# Patient Record
Sex: MALE | Race: WHITE | URBAN - METROPOLITAN AREA
[De-identification: names, ages, dates, MRNs, and addresses within clinical notes are randomized per-mention and may not be internally consistent; named-entity substitution may affect disease eponyms.]

---

## 2021-05-06 ENCOUNTER — OFFICE VISIT (OUTPATIENT)
Dept: URBAN - METROPOLITAN AREA CLINIC 7 | Facility: CLINIC | Age: 76
End: 2021-05-06
Payer: MEDICARE

## 2021-05-06 DIAGNOSIS — H25.13 AGE-RELATED NUCLEAR CATARACT, BILATERAL: ICD-10-CM

## 2021-05-06 DIAGNOSIS — H43.813 BILATERAL VITREOUS DEGENERATION OF EYES: ICD-10-CM

## 2021-05-06 PROCEDURE — 92134 CPTRZ OPH DX IMG PST SGM RTA: CPT | Performed by: OPHTHALMOLOGY

## 2021-05-06 PROCEDURE — 99204 OFFICE O/P NEW MOD 45 MIN: CPT | Performed by: OPHTHALMOLOGY

## 2021-05-06 ASSESSMENT — INTRAOCULAR PRESSURE
OS: 19
OD: 17

## 2021-05-06 NOTE — IMPRESSION/PLAN
Impression: Puckering of macula, bilateral: H35.373. Plan: Exam/OCT show moderate ERM with loss of foveal contour OU. Pt feels that [address] has declined over past year, but denies distortion. VA today is 20/50 in each eye. Recommend careful refraction. If VA can be corrected, recommend new MRx. If VA cannot be corrected, then will proceed with PPV/MP. Thanks, Dixie Steele. 3 months, OCT OU Addendum: Spoke with Dr. Shirley Carey, who relayed that patient has already been refracted and cannot be corrected better than 20/40-2. Given this information, surgical correction is recommended. Will contact patient to inform him of RBAs of surgical intervention.

## 2021-07-07 ENCOUNTER — SURGERY (OUTPATIENT)
Dept: URBAN - METROPOLITAN AREA EXTERNAL CLINIC 26 | Facility: EXTERNAL CLINIC | Age: 76
End: 2021-07-07
Payer: MEDICARE

## 2021-07-07 PROCEDURE — 67042 VIT FOR MACULAR HOLE: CPT | Performed by: OPHTHALMOLOGY

## 2021-07-08 ENCOUNTER — POST-OPERATIVE VISIT (OUTPATIENT)
Dept: URBAN - METROPOLITAN AREA CLINIC 7 | Facility: CLINIC | Age: 76
End: 2021-07-08
Payer: MEDICARE

## 2021-07-08 PROCEDURE — 99024 POSTOP FOLLOW-UP VISIT: CPT | Performed by: OPHTHALMOLOGY

## 2021-07-08 ASSESSMENT — INTRAOCULAR PRESSURE
OS: 18
OD: 10

## 2021-07-08 NOTE — IMPRESSION/PLAN
Impression: S/P PPV/MP OD - 1 Day. Puckering of macula, bilateral  H35.373. Plan: Retina attached. IOP WNL OU. No infection. Patient is doing well. Starting Prednisolone and Ofloxacin QID OD. Return in 1 week pos

## 2021-07-15 ENCOUNTER — POST-OPERATIVE VISIT (OUTPATIENT)
Dept: URBAN - METROPOLITAN AREA CLINIC 7 | Facility: CLINIC | Age: 76
End: 2021-07-15
Payer: MEDICARE

## 2021-07-15 DIAGNOSIS — H35.373 PUCKERING OF MACULA, BILATERAL: Primary | ICD-10-CM

## 2021-07-15 PROCEDURE — 99024 POSTOP FOLLOW-UP VISIT: CPT | Performed by: OPHTHALMOLOGY

## 2021-07-15 RX ORDER — OFLOXACIN 3 MG/ML
0.3 % SOLUTION/ DROPS OPHTHALMIC
Qty: 5 | Refills: 3 | Status: INACTIVE
Start: 2021-07-15 | End: 2021-07-29

## 2021-07-15 RX ORDER — PREDNISOLONE ACETATE 10 MG/ML
1 % SUSPENSION/ DROPS OPHTHALMIC
Qty: 5 | Refills: 3 | Status: INACTIVE
Start: 2021-07-15 | End: 2021-07-29

## 2021-07-15 ASSESSMENT — INTRAOCULAR PRESSURE
OD: 15
OS: 18

## 2021-07-15 NOTE — IMPRESSION/PLAN
Impression: S/P 25g PPV/MP OD - 8 Days. Puckering of macula, bilateral  H35.373. Plan: Doing well, taper Rx. Discussed RBA of obs vs PPV/MP for OS at length, pt would like to proceed with Sx. Will complete consents today and schedule at patient's convenience. 

1 month, POS OD

## 2021-07-28 ENCOUNTER — SURGERY (OUTPATIENT)
Dept: URBAN - METROPOLITAN AREA EXTERNAL CLINIC 26 | Facility: EXTERNAL CLINIC | Age: 76
End: 2021-07-28
Payer: MEDICARE

## 2021-07-28 PROCEDURE — 67042 VIT FOR MACULAR HOLE: CPT | Performed by: OPHTHALMOLOGY

## 2021-07-29 ENCOUNTER — POST-OPERATIVE VISIT (OUTPATIENT)
Dept: URBAN - METROPOLITAN AREA CLINIC 7 | Facility: CLINIC | Age: 76
End: 2021-07-29
Payer: MEDICARE

## 2021-07-29 PROCEDURE — 99024 POSTOP FOLLOW-UP VISIT: CPT | Performed by: OPHTHALMOLOGY

## 2021-07-29 ASSESSMENT — INTRAOCULAR PRESSURE
OS: 13
OD: 32

## 2021-07-29 NOTE — IMPRESSION/PLAN
Impression: S/P PPV, MP OS - 1 Day. Puckering of macula, bilateral  H35.373. Plan: Doing well. Start Rx. Stop Rx in OD. IOP OD elevated, possible steroid response, monitor. 

Return 1 week

## 2021-08-04 ENCOUNTER — POST-OPERATIVE VISIT (OUTPATIENT)
Dept: URBAN - METROPOLITAN AREA CLINIC 7 | Facility: CLINIC | Age: 76
End: 2021-08-04
Payer: MEDICARE

## 2021-08-04 PROCEDURE — 99024 POSTOP FOLLOW-UP VISIT: CPT | Performed by: OPHTHALMOLOGY

## 2021-08-04 ASSESSMENT — INTRAOCULAR PRESSURE
OS: 10
OD: 21

## 2021-08-04 NOTE — IMPRESSION/PLAN
Impression: S/P PPV, MP OS - 7 Days. Puckering of macula, bilateral  H35.373.  Plan: Doing well, taper Rx.

4-6 weeks

## 2021-09-01 ENCOUNTER — POST-OPERATIVE VISIT (OUTPATIENT)
Dept: URBAN - METROPOLITAN AREA CLINIC 7 | Facility: CLINIC | Age: 76
End: 2021-09-01
Payer: MEDICARE

## 2021-09-01 PROCEDURE — 99024 POSTOP FOLLOW-UP VISIT: CPT | Performed by: OPHTHALMOLOGY

## 2021-09-01 ASSESSMENT — INTRAOCULAR PRESSURE
OD: 13
OS: 18

## 2021-09-01 NOTE — IMPRESSION/PLAN
Impression: S/P PPV, MP OS - 35 Days. Puckering of macula, bilateral  H35.373.  Plan: Doing well.

2 months, OCT OU

## 2021-11-17 ENCOUNTER — OFFICE VISIT (OUTPATIENT)
Dept: URBAN - METROPOLITAN AREA CLINIC 7 | Facility: CLINIC | Age: 76
End: 2021-11-17
Payer: MEDICARE

## 2021-11-17 PROCEDURE — 92134 CPTRZ OPH DX IMG PST SGM RTA: CPT | Performed by: OPHTHALMOLOGY

## 2021-11-17 PROCEDURE — 92014 COMPRE OPH EXAM EST PT 1/>: CPT | Performed by: OPHTHALMOLOGY

## 2021-11-17 ASSESSMENT — INTRAOCULAR PRESSURE
OD: 14
OS: 18

## 2021-11-17 NOTE — IMPRESSION/PLAN
Impression: ERM OU
 - s/p 25g PPV/MP OD 7/7/21
 - s/p 25g PPV/MP OS 7/28/21 Plan: Exam/OCT show post-surgical macula w/o CME OU. OK to return to excellent care of Dr. Florence Vale. Thanks, Renee Ayala! PRN

## 2022-03-04 ENCOUNTER — OFFICE VISIT (OUTPATIENT)
Dept: URBAN - METROPOLITAN AREA CLINIC 13 | Facility: CLINIC | Age: 77
End: 2022-03-04
Payer: MEDICARE

## 2022-03-04 DIAGNOSIS — Z96.1 PRESENCE OF INTRAOCULAR LENS: ICD-10-CM

## 2022-03-04 PROCEDURE — 99213 OFFICE O/P EST LOW 20 MIN: CPT | Performed by: OPHTHALMOLOGY

## 2022-03-04 PROCEDURE — 67515 INJECT/TREAT EYE SOCKET: CPT | Performed by: OPHTHALMOLOGY

## 2022-03-04 PROCEDURE — 92134 CPTRZ OPH DX IMG PST SGM RTA: CPT | Performed by: OPHTHALMOLOGY

## 2022-03-04 RX ORDER — KETOROLAC TROMETHAMINE 5 MG/ML
0.5 % SOLUTION OPHTHALMIC
Qty: 5 | Refills: 5 | Status: ACTIVE
Start: 2022-03-04

## 2022-03-04 ASSESSMENT — INTRAOCULAR PRESSURE
OS: 17
OD: 13

## 2022-03-04 NOTE — IMPRESSION/PLAN
Impression: ERM OU
 - s/p 25g PPV/MP OD 7/7/21
 - s/p 25g PPV/MP OS 7/28/21 Plan: Exam/OCT show post-surgical macula OU.

## 2022-03-04 NOTE — IMPRESSION/PLAN
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 The diagnosis, natural history, and prognosis of cystoid macular edema; as well as the risks and benefits of various treatment options including topical NSAID and corticosteroid eye drops, subtenon's injections and intravitreal injections of Kenalog; along with the alternative of observation, were discussed at length. Pt has been on combo gtt QID since Sx. Recommend STK OU, stop combo gtt, start Acular OU TID. 1 month, OCT OU

## 2022-04-07 ENCOUNTER — OFFICE VISIT (OUTPATIENT)
Dept: URBAN - METROPOLITAN AREA CLINIC 7 | Facility: CLINIC | Age: 77
End: 2022-04-07
Payer: MEDICARE

## 2022-04-07 DIAGNOSIS — H59.033 CYSTOID MACULAR EDEMA FOLLOWING CATARACT SURGERY, BILATERAL: Primary | ICD-10-CM

## 2022-04-07 PROCEDURE — 92012 INTRM OPH EXAM EST PATIENT: CPT | Performed by: OPHTHALMOLOGY

## 2022-04-07 PROCEDURE — 67515 INJECT/TREAT EYE SOCKET: CPT | Performed by: OPHTHALMOLOGY

## 2022-04-07 PROCEDURE — 92242 FLUORESCEIN&ICG ANGIOGRAPHY: CPT | Performed by: OPHTHALMOLOGY

## 2022-04-07 PROCEDURE — 92134 CPTRZ OPH DX IMG PST SGM RTA: CPT | Performed by: OPHTHALMOLOGY

## 2022-04-07 PROCEDURE — 92250 FUNDUS PHOTOGRAPHY W/I&R: CPT | Performed by: OPHTHALMOLOGY

## 2022-04-07 ASSESSMENT — INTRAOCULAR PRESSURE
OD: 15
OS: 17

## 2022-04-07 NOTE — IMPRESSION/PLAN
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 Photos 4/7/22 showed CME OU. FA 4/7/22 showed petaloid leakage OU. ICG showed areas of blockage OU. RBA of obs, topical Tx, and repeat STK reviewed at length; recommend STK OU today. Cont Acular OU TID. Also submit Ozurdex BI for any persistent edema at future visit. 

1 month, OCT OU, reeval edema (Ozurdex)

## 2022-05-05 ENCOUNTER — OFFICE VISIT (OUTPATIENT)
Dept: URBAN - METROPOLITAN AREA CLINIC 7 | Facility: CLINIC | Age: 77
End: 2022-05-05
Payer: MEDICARE

## 2022-05-05 DIAGNOSIS — H35.373 PUCKERING OF MACULA, BILATERAL: ICD-10-CM

## 2022-05-05 DIAGNOSIS — H59.033 CYSTOID MACULAR EDEMA FOLLOWING CATARACT SURGERY, BILATERAL: Primary | ICD-10-CM

## 2022-05-05 DIAGNOSIS — H40.053 OCULAR HYPERTENSION, BILATERAL: ICD-10-CM

## 2022-05-05 PROCEDURE — 92134 CPTRZ OPH DX IMG PST SGM RTA: CPT | Performed by: OPHTHALMOLOGY

## 2022-05-05 PROCEDURE — 92012 INTRM OPH EXAM EST PATIENT: CPT | Performed by: OPHTHALMOLOGY

## 2022-05-05 ASSESSMENT — INTRAOCULAR PRESSURE
OD: 25
OS: 24

## 2022-05-05 NOTE — IMPRESSION/PLAN
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 Photos 4/7/22 showed CME OU. FA 4/7/22 showed petaloid leakage OU. ICG showed areas of blockage OU. Cont Acular OU TID. Proceed with Ozurdex if CME worsens. 

1 month, OCT OU, reeval edema (Ozurdex)

## 2022-06-08 ENCOUNTER — OFFICE VISIT (OUTPATIENT)
Dept: URBAN - METROPOLITAN AREA CLINIC 7 | Facility: CLINIC | Age: 77
End: 2022-06-08
Payer: MEDICARE

## 2022-06-08 DIAGNOSIS — H35.373 PUCKERING OF MACULA, BILATERAL: ICD-10-CM

## 2022-06-08 DIAGNOSIS — H59.033 CYSTOID MACULAR EDEMA FOLLOWING CATARACT SURGERY, BILATERAL: Primary | ICD-10-CM

## 2022-06-08 DIAGNOSIS — H40.053 OCULAR HYPERTENSION, BILATERAL: ICD-10-CM

## 2022-06-08 PROCEDURE — 92012 INTRM OPH EXAM EST PATIENT: CPT | Performed by: OPHTHALMOLOGY

## 2022-06-08 PROCEDURE — 92134 CPTRZ OPH DX IMG PST SGM RTA: CPT | Performed by: OPHTHALMOLOGY

## 2022-06-08 RX ORDER — TIMOLOL MALEATE 5 MG/ML
0.5 % SOLUTION/ DROPS OPHTHALMIC
Qty: 10 | Refills: 2 | Status: ACTIVE
Start: 2022-06-08

## 2022-06-08 ASSESSMENT — INTRAOCULAR PRESSURE
OD: 22
OS: 23

## 2022-06-08 NOTE — IMPRESSION/PLAN
Impression: Ocular hypertension OU Plan: IOP elevated for 2nd consecutive visit. Start Timolol OU BID.

## 2022-06-08 NOTE — IMPRESSION/PLAN
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 Pt is s/p STK OU x2, last 4/7/22. Photos 4/7/22 showed CME OU. FA 4/7/22 showed petaloid leakage OU. ICG showed areas of blockage OU. Reduce Acular to BID OU. Proceed with Ozurdex if CME worsens. 

1 month, OCT OU, reeval edema (Ozurdex)

## 2022-08-03 ENCOUNTER — OFFICE VISIT (OUTPATIENT)
Dept: URBAN - METROPOLITAN AREA CLINIC 7 | Facility: CLINIC | Age: 77
End: 2022-08-03
Payer: MEDICARE

## 2022-08-03 DIAGNOSIS — H30.93 UNSPECIFIED CHORIORETINAL INFLAMMATION, BILATERAL: ICD-10-CM

## 2022-08-03 DIAGNOSIS — H35.373 PUCKERING OF MACULA, BILATERAL: ICD-10-CM

## 2022-08-03 DIAGNOSIS — H59.033 CYSTOID MACULAR EDEMA FOLLOWING CATARACT SURGERY, BILATERAL: Primary | ICD-10-CM

## 2022-08-03 DIAGNOSIS — H40.053 OCULAR HYPERTENSION, BILATERAL: ICD-10-CM

## 2022-08-03 PROCEDURE — 92012 INTRM OPH EXAM EST PATIENT: CPT | Performed by: OPHTHALMOLOGY

## 2022-08-03 PROCEDURE — 92134 CPTRZ OPH DX IMG PST SGM RTA: CPT | Performed by: OPHTHALMOLOGY

## 2022-08-03 ASSESSMENT — INTRAOCULAR PRESSURE
OD: 17
OS: 22

## 2022-08-03 NOTE — IMPRESSION/PLAN
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 Pt is s/p STK OU x2, last 4/7/22. Photos 4/7/22 showed CME OU. FA 4/7/22 showed petaloid leakage OU. ICG showed areas of blockage OU. Recommend proceeding with Ozurdex OU today. Cont Acular BID OU. Will also submit Yutiq BI, in case it's needed. 

6 weeks, OCT OU, reeval edema (Ozurdex vs Yutiq)

## 2022-10-05 ENCOUNTER — OFFICE VISIT (OUTPATIENT)
Dept: URBAN - METROPOLITAN AREA CLINIC 7 | Facility: CLINIC | Age: 77
End: 2022-10-05
Payer: MEDICARE

## 2022-10-05 DIAGNOSIS — H35.373 PUCKERING OF MACULA, BILATERAL: ICD-10-CM

## 2022-10-05 DIAGNOSIS — H59.033 CYSTOID MACULAR EDEMA FOLLOWING CATARACT SURGERY, BILATERAL: Primary | ICD-10-CM

## 2022-10-05 DIAGNOSIS — H40.053 OCULAR HYPERTENSION, BILATERAL: ICD-10-CM

## 2022-10-05 DIAGNOSIS — Z96.1 PRESENCE OF INTRAOCULAR LENS: ICD-10-CM

## 2022-10-05 DIAGNOSIS — H30.93 UNSPECIFIED CHORIORETINAL INFLAMMATION, BILATERAL: ICD-10-CM

## 2022-10-05 PROCEDURE — 92134 CPTRZ OPH DX IMG PST SGM RTA: CPT | Performed by: OPHTHALMOLOGY

## 2022-10-05 PROCEDURE — 92014 COMPRE OPH EXAM EST PT 1/>: CPT | Performed by: OPHTHALMOLOGY

## 2022-10-05 RX ORDER — BRIMONIDINE TARTRATE 2 MG/ML
0.2 % SOLUTION/ DROPS OPHTHALMIC
Qty: 5 | Refills: 3 | Status: ACTIVE
Start: 2022-10-05

## 2022-10-05 ASSESSMENT — INTRAOCULAR PRESSURE
OS: 25
OD: 19

## 2022-10-05 NOTE — IMPRESSION/PLAN
Impression: Ocular hypertension OU Plan: IOP OS remains elevated, despite Timolol OU BID. Add Alphagan OS BID.

## 2022-10-05 NOTE — IMPRESSION/PLAN
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 Photos 4/7/22 showed CME OU. FA 4/7/22 showed petaloid leakage OU. ICG showed areas of blockage OU. Reduce Acular OU to QD. Will also submit Yutiq BI, in case it's needed. 

6 weeks, OCT OU, reeval edema (Ozurdex vs Yutiq)

## 2022-11-16 ENCOUNTER — OFFICE VISIT (OUTPATIENT)
Dept: URBAN - METROPOLITAN AREA CLINIC 7 | Facility: CLINIC | Age: 77
End: 2022-11-16
Payer: MEDICARE

## 2022-11-16 DIAGNOSIS — H30.93 UNSPECIFIED CHORIORETINAL INFLAMMATION, BILATERAL: ICD-10-CM

## 2022-11-16 DIAGNOSIS — H40.053 OCULAR HYPERTENSION, BILATERAL: ICD-10-CM

## 2022-11-16 DIAGNOSIS — H59.033 CYSTOID MACULAR EDEMA FOLLOWING CATARACT SURGERY, BILATERAL: Primary | ICD-10-CM

## 2022-11-16 PROCEDURE — 92134 CPTRZ OPH DX IMG PST SGM RTA: CPT | Performed by: OPHTHALMOLOGY

## 2022-11-16 PROCEDURE — 67028 INJECTION EYE DRUG: CPT | Performed by: OPHTHALMOLOGY

## 2022-11-16 PROCEDURE — 92012 INTRM OPH EXAM EST PATIENT: CPT | Performed by: OPHTHALMOLOGY

## 2022-11-16 ASSESSMENT — INTRAOCULAR PRESSURE
OD: 11
OS: 27

## 2022-11-16 NOTE — IMPRESSION/PLAN
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 Photos 4/7/22 showed CME OU. FA 4/7/22 showed petaloid leakage OU. ICG showed areas of blockage OU. Proceed with Yutiq OD today. Stop Acular OU and monitor.  

6 weeks, OCT OU, reeval edema (Ozurdex vs Yutiq)

## 2022-11-16 NOTE — IMPRESSION/PLAN
Impression: Ocular hypertension OU Plan: IOP OS remains elevated, despite Timolol OU BID and Alphagan OS BID. Will refer to Glaucoma for evaluation.

## 2022-12-21 ENCOUNTER — OFFICE VISIT (OUTPATIENT)
Dept: URBAN - METROPOLITAN AREA CLINIC 7 | Facility: CLINIC | Age: 77
End: 2022-12-21
Payer: MEDICARE

## 2022-12-21 DIAGNOSIS — H30.93 UNSPECIFIED CHORIORETINAL INFLAMMATION, BILATERAL: ICD-10-CM

## 2022-12-21 DIAGNOSIS — H40.053 OCULAR HYPERTENSION, BILATERAL: ICD-10-CM

## 2022-12-21 DIAGNOSIS — H59.033 CYSTOID MACULAR EDEMA FOLLOWING CATARACT SURGERY, BILATERAL: Primary | ICD-10-CM

## 2022-12-21 PROCEDURE — 92134 CPTRZ OPH DX IMG PST SGM RTA: CPT | Performed by: OPHTHALMOLOGY

## 2022-12-21 PROCEDURE — 92012 INTRM OPH EXAM EST PATIENT: CPT | Performed by: OPHTHALMOLOGY

## 2022-12-21 PROCEDURE — 67028 INJECTION EYE DRUG: CPT | Performed by: OPHTHALMOLOGY

## 2022-12-21 ASSESSMENT — INTRAOCULAR PRESSURE
OS: 12
OD: 12

## 2022-12-21 NOTE — IMPRESSION/PLAN
Impression: Ocular hypertension OU Plan: Cont Timolol OU BID and Alphagan OS BID. Pt saw Glaucoma, who did not change Tx.

## 2022-12-21 NOTE — IMPRESSION/PLAN
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 Photos 4/7/22 showed CME OU. FA 4/7/22 showed petaloid leakage OU. ICG showed areas of blockage OU. Given recurrence, proceed with Ozurdex OS today, followed by Tanya Anderson as a staged treatment. 

4 weeks, OCT OU/Yutiq OS (no dilation)

## 2023-02-01 ENCOUNTER — PROCEDURE (OUTPATIENT)
Dept: URBAN - METROPOLITAN AREA CLINIC 7 | Facility: CLINIC | Age: 78
End: 2023-02-01
Payer: MEDICARE

## 2023-02-01 DIAGNOSIS — H59.033 CYSTOID MACULAR EDEMA FOLLOWING CATARACT SURGERY, BILATERAL: Primary | ICD-10-CM

## 2023-02-01 PROCEDURE — 67028 INJECTION EYE DRUG: CPT | Performed by: OPHTHALMOLOGY

## 2023-02-01 PROCEDURE — 92134 CPTRZ OPH DX IMG PST SGM RTA: CPT | Performed by: OPHTHALMOLOGY

## 2023-02-01 ASSESSMENT — INTRAOCULAR PRESSURE
OD: 12
OS: 20
OD: 19

## 2023-02-03 ENCOUNTER — OFFICE VISIT (OUTPATIENT)
Dept: URBAN - METROPOLITAN AREA CLINIC 13 | Facility: CLINIC | Age: 78
End: 2023-02-03
Payer: MEDICARE

## 2023-02-03 DIAGNOSIS — H40.053 OCULAR HYPERTENSION, BILATERAL: ICD-10-CM

## 2023-02-03 DIAGNOSIS — H30.93 UNSPECIFIED CHORIORETINAL INFLAMMATION, BILATERAL: ICD-10-CM

## 2023-02-03 DIAGNOSIS — H44.712: Primary | ICD-10-CM

## 2023-02-03 PROCEDURE — 92012 INTRM OPH EXAM EST PATIENT: CPT | Performed by: OPHTHALMOLOGY

## 2023-02-03 PROCEDURE — 92134 CPTRZ OPH DX IMG PST SGM RTA: CPT | Performed by: OPHTHALMOLOGY

## 2023-02-03 ASSESSMENT — INTRAOCULAR PRESSURE
OD: 18
OS: 27

## 2023-02-03 NOTE — IMPRESSION/PLAN
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
 - s/p Yutiq OD 11/16/22 and OS 2/1/23 Plan:
Impression: Ocular hypertension OU Plan: IOP elevated OS. Cont Timolol OU BID and Alphagan OS BID. Pt saw Glaucoma, who did not change Tx. Will recheck next week.
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 No inflammation noted, cornea clear. Recommend laying on back as much as possible, will monitor for toxicity. 

1 week, no dilation
Impression: Unspecified chorioretinal inflammation OU Plan: See above.
[Initial Evaluation] : an initial evaluation

## 2023-02-07 ENCOUNTER — OFFICE VISIT (OUTPATIENT)
Dept: URBAN - METROPOLITAN AREA CLINIC 13 | Facility: CLINIC | Age: 78
End: 2023-02-07
Payer: MEDICARE

## 2023-02-07 DIAGNOSIS — H35.373 PUCKERING OF MACULA, BILATERAL: ICD-10-CM

## 2023-02-07 DIAGNOSIS — H59.033 CYSTOID MACULAR EDEMA FOLLOWING CATARACT SURGERY, BILATERAL: ICD-10-CM

## 2023-02-07 DIAGNOSIS — H40.053 OCULAR HYPERTENSION, BILATERAL: ICD-10-CM

## 2023-02-07 DIAGNOSIS — H44.712: Primary | ICD-10-CM

## 2023-02-07 DIAGNOSIS — H30.93 UNSPECIFIED CHORIORETINAL INFLAMMATION, BILATERAL: ICD-10-CM

## 2023-02-07 PROCEDURE — 92134 CPTRZ OPH DX IMG PST SGM RTA: CPT | Performed by: OPHTHALMOLOGY

## 2023-02-07 PROCEDURE — 99212 OFFICE O/P EST SF 10 MIN: CPT | Performed by: OPHTHALMOLOGY

## 2023-02-07 ASSESSMENT — INTRAOCULAR PRESSURE
OD: 15
OS: 13

## 2023-02-07 NOTE — IMPRESSION/PLAN
Impression: Puckering of macula, bilateral: H35.373
 - s/p 25g PPV/MP OD 7/7/21
 - s/p 25g PPV/MP OS 7/28/21 Plan: Stable and well healed. Improving contour. No tears or retinal detachment seen. Retinal detachment warnings given. Call ASAP with changes.

## 2023-02-07 NOTE — IMPRESSION/PLAN
Impression: Ocular hypertension OU Plan: IOP elevated OS. Cont Timolol OU BID and Alphagan OS BID. Pt saw Glaucoma, who did not change Tx. Will recheck next week.

## 2023-02-07 NOTE — IMPRESSION/PLAN
Impression: Cystoid macular edema following cataract surgery, bilateral: H59.033.
 - s/p Yutiq OD 11/16/22 and OS 2/1/23 Plan: Please see above.

## 2023-02-07 NOTE — IMPRESSION/PLAN
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 Plan: Still with Yutiq in [facility] Some corneal edema No concern for pH difference, but potential for long-term endothelial trauma Would rec f/u with [facility] for eval - may consider AC washout, removal vs repositioning f/u with [facility]

## 2023-02-15 ENCOUNTER — OFFICE VISIT (OUTPATIENT)
Dept: URBAN - METROPOLITAN AREA CLINIC 7 | Facility: CLINIC | Age: 78
End: 2023-02-15
Payer: MEDICARE

## 2023-02-15 DIAGNOSIS — H59.033 CYSTOID MACULAR EDEMA FOLLOWING CATARACT SURGERY, BILATERAL: ICD-10-CM

## 2023-02-15 DIAGNOSIS — H44.712: ICD-10-CM

## 2023-02-15 DIAGNOSIS — H40.053 OCULAR HYPERTENSION, BILATERAL: ICD-10-CM

## 2023-02-15 DIAGNOSIS — H10.012 ACUTE FOLLICULAR CONJUNCTIVITIS, LEFT EYE: Primary | ICD-10-CM

## 2023-02-15 PROCEDURE — 99213 OFFICE O/P EST LOW 20 MIN: CPT | Performed by: OPHTHALMOLOGY

## 2023-02-15 ASSESSMENT — INTRAOCULAR PRESSURE
OS: 15
OD: 13

## 2023-02-15 NOTE — IMPRESSION/PLAN
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
 - s/p Yutiq OD 11/16/22 and OS 2/1/23 Plan: Home

## 2023-02-15 NOTE — IMPRESSION/PLAN
Impression: Acute follicular conjunctivitis OS Plan: Pt appears to have viral conjunctivitis with lid edema, tearing, follicular reaction. Will place betadine today. Rec ATs and cool compresses. 

1 week

## 2023-02-15 NOTE — IMPRESSION/PLAN
Impression: Retained nonmagnetic FB in ant chamber OS Plan: Still with Yutiq in [facility], increasing corneal edema. Given potential for long-term endothelial trauma and increasing K edema, recommend ACWO with removal vs repositioning. Will wait until conjunctivitis resolved.

## 2023-02-22 ENCOUNTER — OFFICE VISIT (OUTPATIENT)
Dept: URBAN - METROPOLITAN AREA CLINIC 7 | Facility: CLINIC | Age: 78
End: 2023-02-22
Payer: MEDICARE

## 2023-02-22 DIAGNOSIS — H44.712: ICD-10-CM

## 2023-02-22 DIAGNOSIS — H10.012 ACUTE FOLLICULAR CONJUNCTIVITIS, LEFT EYE: Primary | ICD-10-CM

## 2023-02-22 DIAGNOSIS — H59.033 CYSTOID MACULAR EDEMA FOLLOWING CATARACT SURGERY, BILATERAL: ICD-10-CM

## 2023-02-22 DIAGNOSIS — H40.053 OCULAR HYPERTENSION, BILATERAL: ICD-10-CM

## 2023-02-22 PROCEDURE — 92012 INTRM OPH EXAM EST PATIENT: CPT | Performed by: OPHTHALMOLOGY

## 2023-02-22 NOTE — IMPRESSION/PLAN
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
 - s/p Yutiq OD 11/16/22 and OS 2/1/23 Plan:

## 2023-02-22 NOTE — IMPRESSION/PLAN
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 Given potential for long-term endothelial trauma and increasing K edema, recommend ACWO with removal vs repositioning. RBA reviewed at length, pt elects to proceed. 

25g PPV/ACWO/reposition vs remove implant OS

## 2023-02-22 NOTE — IMPRESSION/PLAN
Impression: Acute follicular conjunctivitis OS Plan: Nearly resolved s/p Betadine wassh 2/15/23. Cont supportive care.

## 2023-02-22 NOTE — IMPRESSION/PLAN
Impression: Ocular hypertension OU Plan: IOP wnl OS. Cont Timolol OU BID and Alphagan OS BID. Pt saw Glaucoma, who did not change Tx.

## 2023-03-02 ENCOUNTER — POST-OPERATIVE VISIT (OUTPATIENT)
Dept: URBAN - METROPOLITAN AREA CLINIC 7 | Facility: CLINIC | Age: 78
End: 2023-03-02
Payer: MEDICARE

## 2023-03-02 DIAGNOSIS — H59.033 CYSTOID MACULAR EDEMA FOLLOWING CATARACT SURGERY, BILATERAL: Primary | ICD-10-CM

## 2023-03-02 PROCEDURE — 99024 POSTOP FOLLOW-UP VISIT: CPT | Performed by: OPHTHALMOLOGY

## 2023-03-02 ASSESSMENT — INTRAOCULAR PRESSURE
OD: 23
OS: 20

## 2023-03-02 NOTE — IMPRESSION/PLAN
Impression: S/P 25g PPV/ACWO/reposition vs remove implant OS OS - 03/01/2023 (Aakash Rocha) Plan: Retina is attached. No s/s of infection IOP is good at (20) Drops Vigamox QID Pred QID 

RTC 1 week

## 2023-03-08 ENCOUNTER — POST-OPERATIVE VISIT (OUTPATIENT)
Dept: URBAN - METROPOLITAN AREA CLINIC 7 | Facility: CLINIC | Age: 78
End: 2023-03-08
Payer: MEDICARE

## 2023-03-08 DIAGNOSIS — H44.712: Primary | ICD-10-CM

## 2023-03-08 PROCEDURE — 99024 POSTOP FOLLOW-UP VISIT: CPT | Performed by: OPHTHALMOLOGY

## 2023-03-08 ASSESSMENT — INTRAOCULAR PRESSURE
OS: 20
OD: 15

## 2023-03-08 NOTE — IMPRESSION/PLAN
Impression: S/P 25g PPV/ACWO/reposition vs remove implant OS OS - 7 Days. 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 Plan: Keyon Chou has reappeared in [facility] with secondary K edema. Discussed with patient. Recommend removal from eye. RBA reviewed at length, pt elects to proceed. 

25g PPV/remove FB in AC OS

## 2023-03-09 ENCOUNTER — POST-OPERATIVE VISIT (OUTPATIENT)
Dept: URBAN - METROPOLITAN AREA CLINIC 7 | Facility: CLINIC | Age: 78
End: 2023-03-09
Payer: MEDICARE

## 2023-03-09 PROCEDURE — 99024 POSTOP FOLLOW-UP VISIT: CPT | Performed by: OPHTHALMOLOGY

## 2023-03-09 ASSESSMENT — INTRAOCULAR PRESSURE
OS: 48
OD: 14

## 2023-03-09 NOTE — IMPRESSION/PLAN
Impression: S/P 25g PPV/ACWO/reposition vs remove implant OS OS - 1 Day. 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 Plan: No infection. Retina attached. Yutiq successfully removed from [facility]. IOP elevated OS. Patient has not been using his IOP lowering meds in left eye for 1 week (Timolol and Brimonidine). Discussed treatment options (AC paracentesis, restart IOP lowering meds). Patient defers AC paracentesis. Recommend patient restart Timolol BID OS and Brimonidine BID OS and return to see Dr. Agrawal Pod early next week. Start Prednisolone and Ofloxacin QID OS. Return in 1 week pos

## 2023-03-22 ENCOUNTER — POST-OPERATIVE VISIT (OUTPATIENT)
Dept: URBAN - METROPOLITAN AREA CLINIC 7 | Facility: CLINIC | Age: 78
End: 2023-03-22
Payer: MEDICARE

## 2023-03-22 DIAGNOSIS — H44.712: Primary | ICD-10-CM

## 2023-03-22 PROCEDURE — 99024 POSTOP FOLLOW-UP VISIT: CPT | Performed by: OPHTHALMOLOGY

## 2023-03-22 ASSESSMENT — INTRAOCULAR PRESSURE
OS: 10
OD: 16

## 2023-03-22 NOTE — IMPRESSION/PLAN
Impression: S/P Removal implant in AC, anterior approach OS - 21 Days. 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 Plan: Doing well, taper Rx. Will follow for recurrence of inflammation/edema. 

4 weeks, OCT OU, reeval (Jesica Romero)

## 2023-04-19 ENCOUNTER — OFFICE VISIT (OUTPATIENT)
Dept: URBAN - METROPOLITAN AREA CLINIC 7 | Facility: CLINIC | Age: 78
End: 2023-04-19
Payer: MEDICARE

## 2023-04-19 DIAGNOSIS — H40.053 OCULAR HYPERTENSION, BILATERAL: ICD-10-CM

## 2023-04-19 DIAGNOSIS — H30.93 UNSPECIFIED CHORIORETINAL INFLAMMATION, BILATERAL: ICD-10-CM

## 2023-04-19 DIAGNOSIS — H35.353 CYSTOID MACULAR DEGENERATION, BILATERAL: Primary | ICD-10-CM

## 2023-04-19 DIAGNOSIS — H44.712: ICD-10-CM

## 2023-04-19 PROCEDURE — 0465T SUPRACHOROIDAL INJECTION OF PHARMACOLOGICAL AGENT: CPT | Performed by: OPHTHALMOLOGY

## 2023-04-19 PROCEDURE — 92134 CPTRZ OPH DX IMG PST SGM RTA: CPT | Performed by: OPHTHALMOLOGY

## 2023-04-19 PROCEDURE — 92012 INTRM OPH EXAM EST PATIENT: CPT | Performed by: OPHTHALMOLOGY

## 2023-04-19 ASSESSMENT — INTRAOCULAR PRESSURE
OS: 16
OD: 13

## 2023-04-19 NOTE — IMPRESSION/PLAN
Impression: h/o retained nonmagnetic FB in ant chamber OS Cherise Second)  - s/p removal (anterior approach) 3/1/23 Plan: Stable

## 2023-04-19 NOTE — IMPRESSION/PLAN
Impression: Unspecified chorioretinal inflammation OU
 - s/p Yutiq OD 11/16/22 and OS 2/1/23 Plan: No recurrence OD, + recurrence OS. Plan as above.

## 2023-04-19 NOTE — IMPRESSION/PLAN
Impression: Cystoid macular degeneration OU Plan: Exam/OCT show no recurrent CME OD, worsening CME OS. Recommend Xipere OS today. 

6 weeks, OCT OU

## 2023-05-31 ENCOUNTER — OFFICE VISIT (OUTPATIENT)
Dept: URBAN - METROPOLITAN AREA CLINIC 7 | Facility: CLINIC | Age: 78
End: 2023-05-31
Payer: MEDICARE

## 2023-05-31 DIAGNOSIS — H35.353 CYSTOID MACULAR DEGENERATION, BILATERAL: Primary | ICD-10-CM

## 2023-05-31 DIAGNOSIS — H40.053 OCULAR HYPERTENSION, BILATERAL: ICD-10-CM

## 2023-05-31 DIAGNOSIS — H30.93 UNSPECIFIED CHORIORETINAL INFLAMMATION, BILATERAL: ICD-10-CM

## 2023-05-31 DIAGNOSIS — H44.712: ICD-10-CM

## 2023-05-31 DIAGNOSIS — Z96.1 PRESENCE OF INTRAOCULAR LENS: ICD-10-CM

## 2023-05-31 PROCEDURE — 92014 COMPRE OPH EXAM EST PT 1/>: CPT | Performed by: OPHTHALMOLOGY

## 2023-05-31 PROCEDURE — 92134 CPTRZ OPH DX IMG PST SGM RTA: CPT | Performed by: OPHTHALMOLOGY

## 2023-05-31 ASSESSMENT — INTRAOCULAR PRESSURE
OS: 22
OD: 20

## 2023-05-31 NOTE — IMPRESSION/PLAN
Impression: Cystoid macular degeneration OU
 - s/p Xipere OS 4/19/23 Plan: Pt notes improved VA OS, followed by subjective decline over past week. Exam/OCT show no recurrent CME OD, resolved CME OS. Discussed obs vs repeat Xipere OS today, as pt is anatomically stable, will watch closely. 

2 weeks, OCT OS, reeval CME Vaibhav Mow)

## 2023-05-31 NOTE — IMPRESSION/PLAN
Impression: h/o retained nonmagnetic FB in ant chamber OS Bethena Ehsan)  - s/p removal (anterior approach) 3/1/23 Plan: Stable

## 2023-06-14 ENCOUNTER — OFFICE VISIT (OUTPATIENT)
Dept: URBAN - METROPOLITAN AREA CLINIC 27 | Facility: CLINIC | Age: 78
End: 2023-06-14
Payer: MEDICARE

## 2023-06-14 DIAGNOSIS — H40.053 OCULAR HYPERTENSION, BILATERAL: ICD-10-CM

## 2023-06-14 DIAGNOSIS — H30.93 UNSPECIFIED CHORIORETINAL INFLAMMATION, BILATERAL: ICD-10-CM

## 2023-06-14 DIAGNOSIS — H35.353 CYSTOID MACULAR DEGENERATION, BILATERAL: Primary | ICD-10-CM

## 2023-06-14 PROCEDURE — 92134 CPTRZ OPH DX IMG PST SGM RTA: CPT | Performed by: OPHTHALMOLOGY

## 2023-06-14 PROCEDURE — 92012 INTRM OPH EXAM EST PATIENT: CPT | Performed by: OPHTHALMOLOGY

## 2023-06-14 ASSESSMENT — INTRAOCULAR PRESSURE
OS: 30
OD: 17

## 2023-06-14 NOTE — IMPRESSION/PLAN
Impression: Cystoid macular degeneration OU
 - s/p Xipere OS 4/19/23 Plan: Pt notes improved VA OS, followed by subjective decline. Exam/OCT show no recurrent CME OU. Discussed obs vs repeat Xipere OS today, as pt is anatomically stable, will watch closely. 

4 weeks, OCT OS, reeval CME Juan Rcih)

## 2023-07-12 ENCOUNTER — OFFICE VISIT (OUTPATIENT)
Dept: URBAN - METROPOLITAN AREA CLINIC 27 | Facility: CLINIC | Age: 78
End: 2023-07-12
Payer: MEDICARE

## 2023-07-12 DIAGNOSIS — H35.353 CYSTOID MACULAR DEGENERATION, BILATERAL: Primary | ICD-10-CM

## 2023-07-12 DIAGNOSIS — H04.129 DRY EYE SYNDROME OF LACRIMAL GLAND: ICD-10-CM

## 2023-07-12 DIAGNOSIS — H30.93 UNSPECIFIED CHORIORETINAL INFLAMMATION, BILATERAL: ICD-10-CM

## 2023-07-12 DIAGNOSIS — H40.053 OCULAR HYPERTENSION, BILATERAL: ICD-10-CM

## 2023-07-12 PROCEDURE — 92134 CPTRZ OPH DX IMG PST SGM RTA: CPT | Performed by: OPHTHALMOLOGY

## 2023-07-12 PROCEDURE — 92012 INTRM OPH EXAM EST PATIENT: CPT | Performed by: OPHTHALMOLOGY

## 2023-07-12 RX ORDER — VIT E/DHA/LUT/ZEAX/ASTAX/BILB 25-220-5
CAPSULE ORAL
Qty: 60 | Refills: 10 | Status: ACTIVE
Start: 2023-07-12

## 2023-07-12 ASSESSMENT — INTRAOCULAR PRESSURE
OS: 19
OD: 14

## 2023-07-12 NOTE — IMPRESSION/PLAN
Impression: Unspecified chorioretinal inflammation OU
 - s/p Yutiq OD 11/16/22 and OS 2/1/23
 - s/p Yutiq removal OS 3/1/23 Plan: Quiet. Plan as above.

## 2023-07-12 NOTE — IMPRESSION/PLAN
Impression: Dry eye syndrome of lacrimal gland Plan: Pt c/o increased tearing.  Recommend Omega 3s and ATs

## 2023-07-12 NOTE — IMPRESSION/PLAN
Impression: Cystoid macular degeneration OU
 - s/p Xipere OS 4/19/23 Plan: Exam/OCT show trace cystic changes OU. Discussed obs vs repeat Xipere OS today, as pt is anatomically stable, will watch closely. 4-6 weeks, OCT OU, reeval CME Beba Oh)

## 2023-11-06 ENCOUNTER — OFFICE VISIT (OUTPATIENT)
Dept: URBAN - METROPOLITAN AREA CLINIC 27 | Facility: CLINIC | Age: 78
End: 2023-11-06
Payer: MEDICARE

## 2023-11-06 DIAGNOSIS — H04.129 DRY EYE SYNDROME OF LACRIMAL GLAND: ICD-10-CM

## 2023-11-06 DIAGNOSIS — Z96.1 PRESENCE OF INTRAOCULAR LENS: ICD-10-CM

## 2023-11-06 DIAGNOSIS — H40.053 OCULAR HYPERTENSION, BILATERAL: ICD-10-CM

## 2023-11-06 DIAGNOSIS — H30.93 UNSPECIFIED CHORIORETINAL INFLAMMATION, BILATERAL: ICD-10-CM

## 2023-11-06 DIAGNOSIS — H35.81 RETINAL EDEMA: Primary | ICD-10-CM

## 2023-11-06 PROCEDURE — 92014 COMPRE OPH EXAM EST PT 1/>: CPT | Performed by: OPHTHALMOLOGY

## 2023-11-06 PROCEDURE — 92134 CPTRZ OPH DX IMG PST SGM RTA: CPT | Performed by: OPHTHALMOLOGY

## 2023-11-06 PROCEDURE — 0465T SUPRACHOROIDAL INJECTION OF PHARMACOLOGICAL AGENT: CPT | Performed by: OPHTHALMOLOGY

## 2023-11-06 ASSESSMENT — INTRAOCULAR PRESSURE
OD: 15
OS: 17

## 2023-12-19 ENCOUNTER — OFFICE VISIT (OUTPATIENT)
Dept: URBAN - METROPOLITAN AREA CLINIC 27 | Facility: CLINIC | Age: 78
End: 2023-12-19
Payer: MEDICARE

## 2023-12-19 DIAGNOSIS — H40.053 OCULAR HYPERTENSION, BILATERAL: ICD-10-CM

## 2023-12-19 PROCEDURE — 92134 CPTRZ OPH DX IMG PST SGM RTA: CPT | Performed by: OPHTHALMOLOGY

## 2023-12-19 PROCEDURE — 99213 OFFICE O/P EST LOW 20 MIN: CPT | Performed by: OPHTHALMOLOGY

## 2023-12-19 RX ORDER — VIT E/DHA/LUT/ZEAX/ASTAX/BILB 25-220-5
CAPSULE ORAL
Qty: 60 | Refills: 10 | Status: ACTIVE
Start: 2023-12-19

## 2023-12-19 RX ORDER — PREDNISOLONE ACETATE 10 MG/ML
1 % SUSPENSION/ DROPS OPHTHALMIC
Qty: 5 | Refills: 3 | Status: ACTIVE
Start: 2023-12-19

## 2023-12-19 RX ORDER — KETOROLAC TROMETHAMINE 5 MG/ML
0.5 % SOLUTION OPHTHALMIC
Qty: 5 | Refills: 3 | Status: ACTIVE
Start: 2023-12-19

## 2023-12-19 ASSESSMENT — INTRAOCULAR PRESSURE
OD: 13
OS: 18

## 2024-01-30 ENCOUNTER — OFFICE VISIT (OUTPATIENT)
Dept: URBAN - METROPOLITAN AREA CLINIC 27 | Facility: CLINIC | Age: 79
End: 2024-01-30
Payer: MEDICARE

## 2024-01-30 DIAGNOSIS — H30.93 UNSPECIFIED CHORIORETINAL INFLAMMATION, BILATERAL: ICD-10-CM

## 2024-01-30 DIAGNOSIS — H35.81 RETINAL EDEMA: Primary | ICD-10-CM

## 2024-01-30 PROCEDURE — 92134 CPTRZ OPH DX IMG PST SGM RTA: CPT | Performed by: OPHTHALMOLOGY

## 2024-01-30 ASSESSMENT — INTRAOCULAR PRESSURE
OD: 20
OS: 14

## 2024-04-22 ENCOUNTER — OFFICE VISIT (OUTPATIENT)
Dept: URBAN - METROPOLITAN AREA CLINIC 27 | Facility: CLINIC | Age: 79
End: 2024-04-22
Payer: MEDICARE

## 2024-04-22 DIAGNOSIS — H30.93 UNSPECIFIED CHORIORETINAL INFLAMMATION, BILATERAL: ICD-10-CM

## 2024-04-22 DIAGNOSIS — H04.129 DRY EYE SYNDROME OF LACRIMAL GLAND: ICD-10-CM

## 2024-04-22 DIAGNOSIS — H40.053 OCULAR HYPERTENSION, BILATERAL: ICD-10-CM

## 2024-04-22 DIAGNOSIS — H35.81 RETINAL EDEMA: Primary | ICD-10-CM

## 2024-04-22 DIAGNOSIS — Z96.1 PRESENCE OF INTRAOCULAR LENS: ICD-10-CM

## 2024-04-22 PROCEDURE — 92014 COMPRE OPH EXAM EST PT 1/>: CPT | Performed by: OPHTHALMOLOGY

## 2024-04-22 PROCEDURE — 92134 CPTRZ OPH DX IMG PST SGM RTA: CPT | Performed by: OPHTHALMOLOGY

## 2024-04-22 ASSESSMENT — INTRAOCULAR PRESSURE
OD: 20
OS: 19

## 2024-05-01 ENCOUNTER — OFFICE VISIT (OUTPATIENT)
Dept: URBAN - METROPOLITAN AREA CLINIC 27 | Facility: CLINIC | Age: 79
End: 2024-05-01
Payer: MEDICARE

## 2024-05-01 DIAGNOSIS — H35.81 RETINAL EDEMA: Primary | ICD-10-CM

## 2024-05-01 ASSESSMENT — INTRAOCULAR PRESSURE
OS: 15
OD: 13

## 2024-06-12 ENCOUNTER — OFFICE VISIT (OUTPATIENT)
Dept: URBAN - METROPOLITAN AREA CLINIC 27 | Facility: CLINIC | Age: 79
End: 2024-06-12
Payer: MEDICARE

## 2024-06-12 DIAGNOSIS — H04.129 DRY EYE SYNDROME OF LACRIMAL GLAND: ICD-10-CM

## 2024-06-12 DIAGNOSIS — H35.81 RETINAL EDEMA: Primary | ICD-10-CM

## 2024-06-12 DIAGNOSIS — H40.053 OCULAR HYPERTENSION, BILATERAL: ICD-10-CM

## 2024-06-12 DIAGNOSIS — H30.93 UNSPECIFIED CHORIORETINAL INFLAMMATION, BILATERAL: ICD-10-CM

## 2024-06-12 PROCEDURE — 92012 INTRM OPH EXAM EST PATIENT: CPT | Performed by: OPHTHALMOLOGY

## 2024-06-12 PROCEDURE — 92134 CPTRZ OPH DX IMG PST SGM RTA: CPT | Performed by: OPHTHALMOLOGY

## 2024-06-12 PROCEDURE — 67515 INJECT/TREAT EYE SOCKET: CPT | Performed by: OPHTHALMOLOGY

## 2024-06-12 ASSESSMENT — INTRAOCULAR PRESSURE
OS: 12
OD: 11

## 2024-07-24 ENCOUNTER — OFFICE VISIT (OUTPATIENT)
Dept: URBAN - METROPOLITAN AREA CLINIC 27 | Facility: CLINIC | Age: 79
End: 2024-07-24
Payer: MEDICARE

## 2024-07-24 DIAGNOSIS — H04.129 DRY EYE SYNDROME OF LACRIMAL GLAND: ICD-10-CM

## 2024-07-24 DIAGNOSIS — H35.81 RETINAL EDEMA: Primary | ICD-10-CM

## 2024-07-24 DIAGNOSIS — H30.93 UNSPECIFIED CHORIORETINAL INFLAMMATION, BILATERAL: ICD-10-CM

## 2024-07-24 DIAGNOSIS — H40.053 OCULAR HYPERTENSION, BILATERAL: ICD-10-CM

## 2024-07-24 PROCEDURE — 92134 CPTRZ OPH DX IMG PST SGM RTA: CPT | Performed by: OPHTHALMOLOGY

## 2024-07-24 PROCEDURE — 92012 INTRM OPH EXAM EST PATIENT: CPT | Performed by: OPHTHALMOLOGY

## 2024-07-24 ASSESSMENT — INTRAOCULAR PRESSURE
OD: 13
OS: 15

## 2024-09-04 ENCOUNTER — OFFICE VISIT (OUTPATIENT)
Dept: URBAN - METROPOLITAN AREA CLINIC 27 | Facility: CLINIC | Age: 79
End: 2024-09-04
Payer: MEDICARE

## 2024-09-04 DIAGNOSIS — H30.93 UNSPECIFIED CHORIORETINAL INFLAMMATION, BILATERAL: ICD-10-CM

## 2024-09-04 DIAGNOSIS — H40.053 OCULAR HYPERTENSION, BILATERAL: ICD-10-CM

## 2024-09-04 DIAGNOSIS — H35.81 RETINAL EDEMA: Primary | ICD-10-CM

## 2024-09-04 PROCEDURE — 92012 INTRM OPH EXAM EST PATIENT: CPT | Performed by: OPHTHALMOLOGY

## 2024-09-04 PROCEDURE — 92134 CPTRZ OPH DX IMG PST SGM RTA: CPT | Performed by: OPHTHALMOLOGY

## 2024-09-04 ASSESSMENT — INTRAOCULAR PRESSURE
OD: 15
OS: 19

## 2024-10-23 ENCOUNTER — OFFICE VISIT (OUTPATIENT)
Dept: URBAN - METROPOLITAN AREA CLINIC 27 | Facility: CLINIC | Age: 79
End: 2024-10-23
Payer: MEDICARE

## 2024-10-23 DIAGNOSIS — H40.053 OCULAR HYPERTENSION, BILATERAL: ICD-10-CM

## 2024-10-23 DIAGNOSIS — H30.93 UNSPECIFIED CHORIORETINAL INFLAMMATION, BILATERAL: ICD-10-CM

## 2024-10-23 DIAGNOSIS — H35.81 RETINAL EDEMA: Primary | ICD-10-CM

## 2024-10-23 DIAGNOSIS — Z96.1 PRESENCE OF INTRAOCULAR LENS: ICD-10-CM

## 2024-10-23 PROCEDURE — 92134 CPTRZ OPH DX IMG PST SGM RTA: CPT | Performed by: OPHTHALMOLOGY

## 2024-10-23 PROCEDURE — 92014 COMPRE OPH EXAM EST PT 1/>: CPT | Performed by: OPHTHALMOLOGY

## 2024-10-23 ASSESSMENT — INTRAOCULAR PRESSURE
OS: 26
OD: 14

## 2025-01-13 ENCOUNTER — OFFICE VISIT (OUTPATIENT)
Dept: URBAN - METROPOLITAN AREA CLINIC 27 | Facility: CLINIC | Age: 80
End: 2025-01-13
Payer: MEDICARE

## 2025-01-13 DIAGNOSIS — H35.81 RETINAL EDEMA: Primary | ICD-10-CM

## 2025-01-13 DIAGNOSIS — H40.053 OCULAR HYPERTENSION, BILATERAL: ICD-10-CM

## 2025-01-13 DIAGNOSIS — H30.93 UNSPECIFIED CHORIORETINAL INFLAMMATION, BILATERAL: ICD-10-CM

## 2025-01-13 PROCEDURE — 99213 OFFICE O/P EST LOW 20 MIN: CPT | Performed by: OPHTHALMOLOGY

## 2025-01-13 PROCEDURE — 92134 CPTRZ OPH DX IMG PST SGM RTA: CPT | Performed by: OPHTHALMOLOGY

## 2025-01-13 ASSESSMENT — INTRAOCULAR PRESSURE
OD: 16
OS: 18

## 2025-02-26 ENCOUNTER — OFFICE VISIT (OUTPATIENT)
Dept: URBAN - METROPOLITAN AREA CLINIC 27 | Facility: CLINIC | Age: 80
End: 2025-02-26
Payer: MEDICARE

## 2025-02-26 DIAGNOSIS — H35.81 RETINAL EDEMA: Primary | ICD-10-CM

## 2025-02-26 DIAGNOSIS — H30.93 UNSPECIFIED CHORIORETINAL INFLAMMATION, BILATERAL: ICD-10-CM

## 2025-02-26 DIAGNOSIS — H40.053 OCULAR HYPERTENSION, BILATERAL: ICD-10-CM

## 2025-02-26 PROCEDURE — 99213 OFFICE O/P EST LOW 20 MIN: CPT | Performed by: OPHTHALMOLOGY

## 2025-02-26 PROCEDURE — 67516 SPRCHOROIDAL SPC NJX RX AGT: CPT | Performed by: OPHTHALMOLOGY

## 2025-02-26 PROCEDURE — 92134 CPTRZ OPH DX IMG PST SGM RTA: CPT | Performed by: OPHTHALMOLOGY

## 2025-02-26 ASSESSMENT — INTRAOCULAR PRESSURE
OS: 9
OD: 10

## 2025-07-02 ENCOUNTER — OFFICE VISIT (OUTPATIENT)
Dept: URBAN - METROPOLITAN AREA CLINIC 27 | Facility: CLINIC | Age: 80
End: 2025-07-02
Payer: MEDICARE

## 2025-07-02 DIAGNOSIS — H40.053 OCULAR HYPERTENSION, BILATERAL: ICD-10-CM

## 2025-07-02 DIAGNOSIS — Z96.1 PRESENCE OF INTRAOCULAR LENS: ICD-10-CM

## 2025-07-02 DIAGNOSIS — H30.93 UNSPECIFIED CHORIORETINAL INFLAMMATION, BILATERAL: ICD-10-CM

## 2025-07-02 DIAGNOSIS — H35.81 RETINAL EDEMA: Primary | ICD-10-CM

## 2025-07-02 PROCEDURE — 67516 SPRCHOROIDAL SPC NJX RX AGT: CPT | Performed by: OPHTHALMOLOGY

## 2025-07-02 PROCEDURE — 92134 CPTRZ OPH DX IMG PST SGM RTA: CPT | Performed by: OPHTHALMOLOGY

## 2025-07-02 PROCEDURE — 92014 COMPRE OPH EXAM EST PT 1/>: CPT | Performed by: OPHTHALMOLOGY

## 2025-07-02 ASSESSMENT — INTRAOCULAR PRESSURE
OD: 13
OS: 14

## 2025-08-13 ENCOUNTER — OFFICE VISIT (OUTPATIENT)
Dept: URBAN - METROPOLITAN AREA CLINIC 27 | Facility: CLINIC | Age: 80
End: 2025-08-13
Payer: MEDICARE

## 2025-08-13 DIAGNOSIS — H40.053 OCULAR HYPERTENSION, BILATERAL: ICD-10-CM

## 2025-08-13 DIAGNOSIS — H30.93 UNSPECIFIED CHORIORETINAL INFLAMMATION, BILATERAL: ICD-10-CM

## 2025-08-13 DIAGNOSIS — H35.81 RETINAL EDEMA: Primary | ICD-10-CM

## 2025-08-13 PROCEDURE — 92134 CPTRZ OPH DX IMG PST SGM RTA: CPT | Performed by: OPHTHALMOLOGY

## 2025-08-13 PROCEDURE — 99213 OFFICE O/P EST LOW 20 MIN: CPT | Performed by: OPHTHALMOLOGY

## 2025-08-13 ASSESSMENT — INTRAOCULAR PRESSURE
OS: 14
OD: 11